# Patient Record
Sex: MALE | Race: OTHER | URBAN - METROPOLITAN AREA
[De-identification: names, ages, dates, MRNs, and addresses within clinical notes are randomized per-mention and may not be internally consistent; named-entity substitution may affect disease eponyms.]

---

## 2017-01-19 ENCOUNTER — OFFICE VISIT (OUTPATIENT)
Dept: FAMILY MEDICINE CLINIC | Age: 22
End: 2017-01-19

## 2017-01-19 VITALS
TEMPERATURE: 98.7 F | WEIGHT: 131 LBS | SYSTOLIC BLOOD PRESSURE: 116 MMHG | HEIGHT: 67 IN | DIASTOLIC BLOOD PRESSURE: 76 MMHG | BODY MASS INDEX: 20.56 KG/M2 | HEART RATE: 70 BPM

## 2017-01-19 DIAGNOSIS — J02.9 ACUTE PHARYNGITIS, UNSPECIFIED ETIOLOGY: Primary | ICD-10-CM

## 2017-01-19 RX ORDER — PROMETHAZINE HYDROCHLORIDE, PHENYLEPHRINE HYDROCHLORIDE AND CODEINE PHOSPHATE 6.25; 5; 1 MG/5ML; MG/5ML; MG/5ML
5 SOLUTION ORAL
Qty: 120 ML | Refills: 0 | Status: SHIPPED | OUTPATIENT
Start: 2017-01-19

## 2017-01-19 RX ORDER — AZITHROMYCIN 250 MG/1
TABLET, FILM COATED ORAL
Qty: 6 TAB | Refills: 0 | Status: SHIPPED | OUTPATIENT
Start: 2017-01-19 | End: 2017-01-24

## 2017-01-19 NOTE — MR AVS SNAPSHOT
Visit Information Date & Time Provider Department Dept. Phone Encounter #  
 1/19/2017  3:30 PM Kumar Willams MD [address] [facility] [phone] [id number] Upcoming Health Maintenance Date Due Hepatitis A Peds Age 1-18 (1 of 2 - Standard Series) 12/7/1998 DTaP/Tdap/Td series (1 - Tdap) 12/7/2004 HPV AGE 9Y-26Y (1 of 3 - Male 3 Dose Series) 12/7/2008 INFLUENZA AGE 9 TO ADULT 8/1/2016 Allergies as of 1/19/2017  Review Complete On: 1/19/2017 By: Nicole James LPN No Known Allergies Current Immunizations  Reviewed on 12/6/2016 Name Date Hep B Vaccine (Adult)  Deferred (Patient Refused) Influenza Vaccine  Deferred (Patient Refused) Meningococcal (MCV4P) Vaccine  Deferred (Patient Refused) Tdap  Deferred (Patient Refused) Not reviewed this visit You Were Diagnosed With   
  
 Codes Comments Acute pharyngitis, unspecified etiology    -  Primary ICD-10-CM: J02.9 ICD-9-CM: 142 Vitals BP Pulse Temp Height(growth percentile) 116/76 (37 %/ 58 %)* (BP 1 Location: Left arm, BP Patient Position: Sitting) 70 98.7 °F (37.1 °C) (Oral) 5' 7\" (1.702 m) (18 %, Z= -0.90) Weight(growth percentile) BMI Smoking Status 131 lb (59.4 kg) (15 %, Z= -1.04) 20.52 kg/m2 (22 %, Z= -0.77) Never Smoker *BP percentiles are based on NHBPEP's 4th Report Growth percentiles are based on CDC 2-20 Years data. Vitals History BMI and BSA Data Body Mass Index Body Surface Area 20.52 kg/m 2 1.68 m 2 Preferred Pharmacy Pharmacy Name Phone [facility] [address] , [address] [place] [address] [phone] Your Updated Medication List  
  
   
This list is accurate as of: 1/19/17  4:49 PM.  Always use your most recent med list.  
  
  
  
  
 azithromycin 250 mg tablet Commonly known as:  Robert Listen Take 2 tablets today, then take 1 tablet daily promethazine-phenyleph-codeine 6.25-5-10 mg/5 mL Syrp Commonly known as:  PROMETHAZINE VC-CODEINE Take 5 mL by mouth every six (6) hours as needed. Max Daily Amount: 20 mL. Indications: COLD SYMPTOMS Prescriptions Printed Refills  
 promethazine-phenyleph-codeine (PROMETHAZINE VC-CODEINE) 6.25-5-10 mg/5 mL syrp 0 Sig: Take 5 mL by mouth every six (6) hours as needed. Max Daily Amount: 20 mL. Indications: COLD SYMPTOMS Class: Print Route: Oral  
 azithromycin (ZITHROMAX) 250 mg tablet 0 Sig: Take 2 tablets today, then take 1 tablet daily Class: Print We Performed the Following POC HETEROPHILE ANTIBODY SCREEN [97638 CPT(R)] Introducing [facility] SERVICES! Pradeep Kaiser introduces [employer] patient portal. Now you can access parts of your medical record, email your doctor's office, and request medication refills online. 1. In your internet browser, go to [email or web address] 2. Click on the First Time User? Click Here link in the Sign In box. You will see the New Member Sign Up page. 3. Enter your [employer] Access Code exactly as it appears below. You will not need to use this code after youve completed the sign-up process. If you do not sign up before the expiration date, you must request a new code. · [employer] Access Code: [email or web address] Expires: 3/6/2017  2:36 PM 
 
4. Enter the last four digits of your Social Security Number (xxxx) and Date of Birth (mm/dd/yyyy) as indicated and click Submit. You will be taken to the next sign-up page. 5. Create a [employer] ID. This will be your [employer] login ID and cannot be changed, so think of one that is secure and easy to remember. 6. Create a [employer] password. You can change your password at any time. 7. Enter your Password Reset Question and Answer. This can be used at a later time if you forget your password. 8. Enter your e-mail address.  You will receive e-mail notification when new information is available in OnLive. 9. Click Sign Up. You can now view and download portions of your medical record. 10. Click the Download Summary menu link to download a portable copy of your medical information. If you have questions, please visit the Frequently Asked Questions section of the OnLive website. Remember, OnLive is NOT to be used for urgent needs. For medical emergencies, dial 911. Now available from your iPhone and Android! Please provide this summary of care documentation to your next provider. Your primary care clinician is listed as [facility]. If you have any questions after today's visit, please call [phone].

## 2017-01-19 NOTE — PROGRESS NOTES
HISTORY OF PRESENT ILLNESS  Gaye Peterson is a 25 y.o. male. HPI  Upper respiratory problem    Started >4 days ago not better,   otc not helping, have a very bad Sore throat, with a lot of painful Cough which are Productive yellowish , no hx of asthma  soes not want to get vaccinated, there has been a lot of decrease in the patient's sleep pattern, in addition there has been some muscle ache responding to OTC , + diarhea, no ear ache,also there has been a decrease in the appetite, has been had an exposure to sick person, fortunately a none smoker, not sexually active no sharing drink    No Known Allergies  History reviewed. No pertinent past medical history. History reviewed. No pertinent past surgical history. Family History   Problem Relation Age of Onset    Diabetes Mother     Elevated Lipids Mother      Social History   Substance Use Topics    Smoking status: Never Smoker    Smokeless tobacco: Never Used    Alcohol use No      Lab Results  Component Value Date/Time   WBC 4.1 12/06/2016 02:54 PM   HGB 15.9 12/06/2016 02:54 PM   HCT 46.2 12/06/2016 02:54 PM   PLATELET 612 18/43/2396 02:54 PM   MCV 87 12/06/2016 02:54 PM       Lab Results  Component Value Date/Time   GFR est  12/06/2016 02:54 PM   GFR est non- 12/06/2016 02:54 PM   Creatinine 0.82 12/06/2016 02:54 PM   BUN 15 12/06/2016 02:54 PM   Sodium 141 12/06/2016 02:54 PM   Potassium 4.6 12/06/2016 02:54 PM   Chloride 99 12/06/2016 02:54 PM   CO2 28 12/06/2016 02:54 PM         Review of Systems   Constitutional: Positive for chills, fever and malaise/fatigue. HENT: Positive for congestion and sore throat. Negative for ear pain and nosebleeds. Eyes: Negative for blurred vision, pain and discharge. Respiratory: Positive for cough. Negative for shortness of breath. Cardiovascular: Negative for chest pain and leg swelling. Gastrointestinal: Positive for nausea. Negative for constipation, diarrhea and vomiting.    Genitourinary: Negative for frequency. Musculoskeletal: Negative for joint pain. Skin: Negative for itching and rash. Neurological: Positive for headaches. Psychiatric/Behavioral: Negative for depression. The patient is not nervous/anxious. Physical Exam   Constitutional: He is oriented to person, place, and time. He appears well-developed and well-nourished. HENT:   Head: Normocephalic and atraumatic. Right Ear: No decreased hearing is noted. Left Ear: No decreased hearing is noted. Nose: Mucosal edema and rhinorrhea present. Mouth/Throat: Mucous membranes are dry. Posterior oropharyngeal edema and posterior oropharyngeal erythema present. No oropharyngeal exudate. Eyes: Conjunctivae and EOM are normal.   Neck: Normal range of motion. Neck supple. Cardiovascular: Normal rate, regular rhythm and normal heart sounds. No murmur heard. Pulmonary/Chest: Effort normal and breath sounds normal. No respiratory distress. Abdominal: Soft. Bowel sounds are normal. He exhibits no distension. There is no rebound. Musculoskeletal: He exhibits no edema or tenderness. Lymphadenopathy:     He has cervical adenopathy. Neurological: He is alert and oriented to person, place, and time. Skin: Skin is warm. No erythema. Psychiatric: He has a normal mood and affect. His behavior is normal.   Nursing note and vitals reviewed. ASSESSMENT and PLAN  Yassine was seen today for sore throat and cold symptoms. Diagnoses and all orders for this visit:    Acute pharyngitis, unspecified etiology  -     promethazine-phenyleph-codeine (PROMETHAZINE VC-CODEINE) 6.25-5-10 mg/5 mL syrp; Take 5 mL by mouth every six (6) hours as needed. Max Daily Amount: 20 mL. Indications: COLD SYMPTOMS  -     azithromycin (ZITHROMAX) 250 mg tablet;  Take 2 tablets today, then take 1 tablet daily  -     Cancel: POC HETEROPHILE ANTIBODY SCREEN  -     BRET BARR VIRUS AB PANEL        Salt gurgle, incr po fluid intake, avoid cigs and 2nd hand exposure, rts if worsens, tylenol otc for pain, advised on meds side effects and compliance, hand washing and hygiene advised

## 2017-01-19 NOTE — PROGRESS NOTES
Chief Complaint   Patient presents with    Sore Throat     C/o sore throat and congestion x 3 days, taking nyquil with little relief, denies fever or chills,

## 2017-01-20 LAB
EBV EA IGG SER-ACNC: <9 U/ML (ref 0–8.9)
EBV NA IGG SER IA-ACNC: <18 U/ML (ref 0–17.9)
EBV VCA IGG SER IA-ACNC: <18 U/ML (ref 0–17.9)
EBV VCA IGM SER IA-ACNC: <36 U/ML (ref 0–35.9)
SERVICE CMNT-IMP: NORMAL

## 2017-06-28 ENCOUNTER — OFFICE VISIT (OUTPATIENT)
Dept: FAMILY MEDICINE CLINIC | Age: 22
End: 2017-06-28

## 2017-06-28 VITALS
OXYGEN SATURATION: 97 % | HEART RATE: 125 BPM | BODY MASS INDEX: 21.5 KG/M2 | WEIGHT: 137 LBS | TEMPERATURE: 100.7 F | HEIGHT: 67 IN | RESPIRATION RATE: 16 BRPM | SYSTOLIC BLOOD PRESSURE: 126 MMHG | DIASTOLIC BLOOD PRESSURE: 87 MMHG

## 2017-06-28 DIAGNOSIS — J02.9 SORE THROAT: Primary | ICD-10-CM

## 2017-06-28 DIAGNOSIS — J02.0 ACUTE STREPTOCOCCAL PHARYNGITIS: ICD-10-CM

## 2017-06-28 LAB
MONONUCLEOSIS SCREEN POC: NEGATIVE
S PYO AG THROAT QL: POSITIVE
VALID INTERNAL CONTROL?: YES
VALID INTERNAL CONTROL?: YES

## 2017-06-28 RX ORDER — AZITHROMYCIN 250 MG/1
TABLET, FILM COATED ORAL
Qty: 6 TAB | Refills: 0 | Status: SHIPPED | OUTPATIENT
Start: 2017-06-28 | End: 2017-07-03

## 2017-06-28 RX ORDER — GUAIFENESIN 600 MG/1
600 TABLET, EXTENDED RELEASE ORAL 2 TIMES DAILY
Qty: 15 TAB | Refills: 0 | Status: SHIPPED | OUTPATIENT
Start: 2017-06-28

## 2017-06-28 RX ORDER — CEFTRIAXONE 250 MG/8ML
250 INJECTION, POWDER, FOR SOLUTION INTRAMUSCULAR; INTRAVENOUS ONCE
Qty: 1 VIAL | Refills: 0
Start: 2017-06-28 | End: 2017-06-28

## 2017-06-28 NOTE — PATIENT INSTRUCTIONS
Sore Throat: Care Instructions  Your Care Instructions    Infection by bacteria or a virus causes most sore throats. Cigarette smoke, dry air, air pollution, allergies, and yelling can also cause a sore throat. Sore throats can be painful and annoying. Fortunately, most sore throats go away on their own. If you have a bacterial infection, your doctor may prescribe antibiotics. Follow-up care is a key part of your treatment and safety. Be sure to make and go to all appointments, and call your doctor if you are having problems. It's also a good idea to know your test results and keep a list of the medicines you take. How can you care for yourself at home? · If your doctor prescribed antibiotics, take them as directed. Do not stop taking them just because you feel better. You need to take the full course of antibiotics. · Gargle with warm salt water once an hour to help reduce swelling and relieve discomfort. Use 1 teaspoon of salt mixed in 1 cup of warm water. · Take an over-the-counter pain medicine, such as acetaminophen (Tylenol), ibuprofen (Advil, Motrin), or naproxen (Aleve). Read and follow all instructions on the label. · Be careful when taking over-the-counter cold or flu medicines and Tylenol at the same time. Many of these medicines have acetaminophen, which is Tylenol. Read the labels to make sure that you are not taking more than the recommended dose. Too much acetaminophen (Tylenol) can be harmful. · Drink plenty of fluids. Fluids may help soothe an irritated throat. Hot fluids, such as tea or soup, may help decrease throat pain. · Use over-the-counter throat lozenges to soothe pain. Regular cough drops or hard candy may also help. These should not be given to young children because of the risk of choking. · Do not smoke or allow others to smoke around you. If you need help quitting, talk to your doctor about stop-smoking programs and medicines.  These can increase your chances of quitting for good. · Use a vaporizer or humidifier to add moisture to your bedroom. Follow the directions for cleaning the machine. When should you call for help? Call your doctor now or seek immediate medical care if:  · You have new or worse trouble swallowing. · Your sore throat gets much worse on one side. Watch closely for changes in your health, and be sure to contact your doctor if you do not get better as expected. Where can you learn more? Go to http://michael-florin.info/. Enter [phone] in the search box to learn more about \"Sore Throat: Care Instructions. \"  Current as of: July 29, 2016  Content Version: 11.3  © 4321-4600 [employer]. Care instructions adapted under license by [employer] (which disclaims liability or warranty for this information). If you have questions about a medical condition or this instruction, always ask your healthcare professional. [address] any warranty or liability for your use of this information.

## 2017-06-28 NOTE — MR AVS SNAPSHOT
Visit Information Date & Time Provider Department Dept. Phone Encounter #  
 6/28/2017  2:00 PM Silvana Antonio MD [address] [place] [address] OFFICE-ANNEX [phone] [id number] Upcoming Health Maintenance Date Due Hepatitis A Peds Age 1-18 (1 of 2 - Standard Series) 12/7/1998 DTaP/Tdap/Td series (1 - Tdap) 12/7/2004 HPV AGE 9Y-26Y (1 of 3 - Male 3 Dose Series) 12/7/2008 INFLUENZA AGE 9 TO ADULT 8/1/2017 Allergies as of 6/28/2017  Review Complete On: 6/28/2017 By: Mitzi Lei LPN No Known Allergies Current Immunizations  Reviewed on 12/6/2016 Name Date Hep B Vaccine (Adult)  Deferred (Patient Refused) Influenza Vaccine  Deferred (Patient Refused) Meningococcal (MCV4P) Vaccine  Deferred (Patient Refused) Tdap  Deferred (Patient Refused) Not reviewed this visit You Were Diagnosed With   
  
 Codes Comments Sore throat    -  Primary ICD-10-CM: J02.9 ICD-9-CM: 515 Acute streptococcal pharyngitis     ICD-10-CM: J02.0 ICD-9-CM: 034.0 Vitals BP Pulse Temp Resp Height(growth percentile) 126/87 (73 %/ 83 %)* (BP 1 Location: Left arm, BP Patient Position: At rest) (!) 125 (!) 100.7 °F (38.2 °C) (Oral) 16 5' 7\" (1.702 m) (18 %, Z= -0.92) Weight(growth percentile) SpO2 BMI Smoking Status 137 lb (62.1 kg) (21 %, Z= -0.79) 97% 21.46 kg/m2 (32 %, Z= -0.48) Never Smoker *BP percentiles are based on NHBPEP's 4th Report Growth percentiles are based on CDC 2-20 Years data. Vitals History BMI and BSA Data Body Mass Index Body Surface Area  
 21.46 kg/m 2 1.71 m 2 Preferred Pharmacy Pharmacy Name Phone [facility] [address] [address], [address] [facility] [phone] Your Updated Medication List  
  
   
This list is accurate as of: 6/28/17  4:07 PM.  Always use your most recent med list.  
  
  
  
  
 azithromycin 250 mg tablet Commonly known as:  Roxann Finch Take 2 tablets today, then take 1 tablet daily  
  
 cefTRIAXone 250 mg injection Commonly known as:  ROCEPHIN  
250 mg by IntraMUSCular route once for 1 dose. guaiFENesin  mg ER tablet Commonly known as:  Mamaya Take 1 Tab by mouth two (2) times a day. promethazine-phenyleph-codeine 6.25-5-10 mg/5 mL Syrp Commonly known as:  PROMETHAZINE VC-CODEINE Take 5 mL by mouth every six (6) hours as needed. Max Daily Amount: 20 mL. Indications: COLD SYMPTOMS Prescriptions Printed Refills  
 azithromycin (ZITHROMAX) 250 mg tablet 0 Sig: Take 2 tablets today, then take 1 tablet daily Class: Print  
 guaiFENesin ER (MUCINEX) 600 mg ER tablet 0 Sig: Take 1 Tab by mouth two (2) times a day. Class: Print Route: Oral  
  
We Performed the Following AMB POC RAPID STREP A [64490 CPT(R)] CEFTRIAXONE SODIUM INJECTION  MG [ [facility]] Comments:  
 Mix per protocol POC HETEROPHILE ANTIBODY SCREEN [01426 CPT(R)] NE THER/PROPH/DIAG INJECTION, SUBCUT/IM [id number] CPT(R)] Patient Instructions Sore Throat: Care Instructions Your Care Instructions Infection by bacteria or a virus causes most sore throats. Cigarette smoke, dry air, air pollution, allergies, and yelling can also cause a sore throat. Sore throats can be painful and annoying. Fortunately, most sore throats go away on their own. If you have a bacterial infection, your doctor may prescribe antibiotics. Follow-up care is a key part of your treatment and safety. Be sure to make and go to all appointments, and call your doctor if you are having problems. It's also a good idea to know your test results and keep a list of the medicines you take. How can you care for yourself at home? · If your doctor prescribed antibiotics, take them as directed. Do not stop taking them just because you feel better. You need to take the full course of antibiotics. · Gargle with warm salt water once an hour to help reduce swelling and relieve discomfort. Use 1 teaspoon of salt mixed in 1 cup of warm water. · Take an over-the-counter pain medicine, such as acetaminophen (Tylenol), ibuprofen (Advil, Motrin), or naproxen (Aleve). Read and follow all instructions on the label. · Be careful when taking over-the-counter cold or flu medicines and Tylenol at the same time. Many of these medicines have acetaminophen, which is Tylenol. Read the labels to make sure that you are not taking more than the recommended dose. Too much acetaminophen (Tylenol) can be harmful. · Drink plenty of fluids. Fluids may help soothe an irritated throat. Hot fluids, such as tea or soup, may help decrease throat pain. · Use over-the-counter throat lozenges to soothe pain. Regular cough drops or hard candy may also help. These should not be given to young children because of the risk of choking. · Do not smoke or allow others to smoke around you. If you need help quitting, talk to your doctor about stop-smoking programs and medicines. These can increase your chances of quitting for good. · Use a vaporizer or humidifier to add moisture to your bedroom. Follow the directions for cleaning the machine. When should you call for help? Call your doctor now or seek immediate medical care if: 
· You have new or worse trouble swallowing. · Your sore throat gets much worse on one side. Watch closely for changes in your health, and be sure to contact your doctor if you do not get better as expected. Where can you learn more? Go to http://michael-florin.info/. Enter [phone] in the search box to learn more about \"Sore Throat: Care Instructions. \" Current as of: July 29, 2016 Content Version: 11.3 © 4544-0016 [employer], [employer].  Care instructions adapted under license by [employer] (which disclaims liability or warranty for this information). If you have questions about a medical condition or this instruction, always ask your healthcare professional. [place] [address] any warranty or liability for your use of this information. Introducing [facility] & HEALTH SERVICES! [facility] introduces [employer] patient portal. Now you can access parts of your medical record, email your doctor's office, and request medication refills online. 1. In your internet browser, go to [email or web address] 2. Click on the First Time User? Click Here link in the Sign In box. You will see the New Member Sign Up page. 3. Enter your [employer] Access Code exactly as it appears below. You will not need to use this code after youve completed the sign-up process. If you do not sign up before the expiration date, you must request a new code. · [employer] Access Code: [facility] GAURAV Expires: 9/26/2017  2:18 PM 
 
4. Enter the last four digits of your Social Security Number (xxxx) and Date of Birth (mm/dd/yyyy) as indicated and click Submit. You will be taken to the next sign-up page. 5. Create a A[employer]ura ID. This will be your A[employer] login ID and cannot be changed, so think of one that is secure and easy to remember. 6. Create a [employer] password. You can change your password at any time. 7. Enter your Password Reset Question and Answer. This can be used at a later time if you forget your password. 8. Enter your e-mail address. You will receive e-mail notification when new information is available in [address]. 9. Click Sign Up. You can now view and download portions of your medical record. 10. Click the Download Summary menu link to download a portable copy of your medical information. If you have questions, please visit the Frequently Asked Questions section of the [employer] website. Remember, [employer] is NOT to be used for urgent needs. For medical emergencies, dial 911. Now available from your iPhone and Android! Please provide this summary of care documentation to your next provider. Your primary care clinician is listed as Rangel Delaney. If you have any questions after today's visit, please call [phone].

## 2017-06-28 NOTE — PROGRESS NOTES
Chief Complaint   Patient presents with    Nasal Congestion       C/o cold symptoms, sore throat x 1 day,  Denies fever, chills. Tried otc allegra and nyquil.

## 2017-06-29 NOTE — PROGRESS NOTES
HISTORY OF PRESENT ILLNESS  Farideh Dominguez is a 25 y.o. male. HPI   Upper respiratory problem    Started >9 days ago not better,   otc not helping, have a very bad Sore throat, with a lot of painful Cough which are Productive yellowish , no hx of asthma , there has been a lot of decrease in the patient's sleep pattern, in addition there has been some muscle ache responding to OTC , no diarhea, no ear ache,also there has been a decrease in the appetite, has been had an exposure to sick person, fortunately anone smoker        Current Outpatient Prescriptions   Medication Sig Dispense Refill    azithromycin (ZITHROMAX) 250 mg tablet Take 2 tablets today, then take 1 tablet daily 6 Tab 0    guaiFENesin ER (MUCINEX) 600 mg ER tablet Take 1 Tab by mouth two (2) times a day. 15 Tab 0    promethazine-phenyleph-codeine (PROMETHAZINE VC-CODEINE) 6.25-5-10 mg/5 mL syrp Take 5 mL by mouth every six (6) hours as needed. Max Daily Amount: 20 mL. Indications: COLD SYMPTOMS 120 mL 0     No Known Allergies  Past Medical History:   Diagnosis Date    Acute pharyngitis 1/19/2017    Acute streptococcal pharyngitis 6/28/2017     No past surgical history on file. Family History   Problem Relation Age of Onset    Diabetes Mother     Elevated Lipids Mother      Social History   Substance Use Topics    Smoking status: Never Smoker    Smokeless tobacco: Never Used    Alcohol use No      Lab Results  Component Value Date/Time   Glucose 93 12/06/2016 02:54 PM   LDL, calculated 69 12/06/2016 02:54 PM   Creatinine 0.82 12/06/2016 02:54 PM      Lab Results  Component Value Date/Time   Cholesterol, total 131 12/06/2016 02:54 PM   HDL Cholesterol 53 12/06/2016 02:54 PM   LDL, calculated 69 12/06/2016 02:54 PM   Triglyceride 44 12/06/2016 02:54 PM        Review of Systems   Constitutional: Negative for chills and fever. HENT: Negative for nosebleeds. Eyes: Negative for pain. Respiratory: Negative for cough and wheezing. Cardiovascular: Negative for chest pain and leg swelling. Gastrointestinal: Negative for constipation, diarrhea and nausea. Genitourinary: Negative for frequency. Musculoskeletal: Negative for joint pain and myalgias. Skin: Negative for rash. Neurological: Negative for loss of consciousness. Endo/Heme/Allergies: Does not bruise/bleed easily. Psychiatric/Behavioral: Negative for depression. The patient is not nervous/anxious and does not have insomnia. All other systems reviewed and are negative. Physical Exam   Constitutional: He is oriented to person, place, and time. He appears well-developed and well-nourished. HENT:   Head: Normocephalic and atraumatic. Mouth/Throat: No oropharyngeal exudate. Eyes: Conjunctivae and EOM are normal.   Neck: Normal range of motion. Neck supple. Cardiovascular: Normal rate, regular rhythm and normal heart sounds. No murmur heard. Pulmonary/Chest: Effort normal and breath sounds normal. No respiratory distress. Abdominal: Soft. Bowel sounds are normal. He exhibits no distension. There is no rebound. Musculoskeletal: He exhibits no edema or tenderness. Neurological: He is alert and oriented to person, place, and time. Skin: Skin is warm. No erythema. Psychiatric: He has a normal mood and affect. His behavior is normal.   Nursing note and vitals reviewed. ASSESSMENT and PLAN  Yassine was seen today for cold symptoms. Diagnoses and all orders for this visit:    Sore throat  -     AMB POC RAPID STREP A  -     POC HETEROPHILE ANTIBODY SCREEN  -     CEFTRIAXONE SODIUM INJECTION  MG  -     IA THER/PROPH/DIAG INJECTION, SUBCUT/IM    Acute streptococcal pharyngitis  -     CEFTRIAXONE SODIUM INJECTION  MG  -     IA THER/PROPH/DIAG INJECTION, SUBCUT/IM    Other orders  -     Cancel: POC HETEROPHILE ANTIBODY SCREEN  -     azithromycin (ZITHROMAX) 250 mg tablet;  Take 2 tablets today, then take 1 tablet daily  -     guaiFENesin ER (MUCINEX) 600 mg ER tablet; Take 1 Tab by mouth two (2) times a day. -     cefTRIAXone (ROCEPHIN) 250 mg injection; 250 mg by IntraMUSCular route once for 1 dose.